# Patient Record
Sex: MALE | Race: WHITE | NOT HISPANIC OR LATINO | Employment: OTHER
[De-identification: names, ages, dates, MRNs, and addresses within clinical notes are randomized per-mention and may not be internally consistent; named-entity substitution may affect disease eponyms.]

---

## 2023-10-30 ENCOUNTER — DASHBOARD ENCOUNTERS (OUTPATIENT)
Age: 83
End: 2023-10-30

## 2023-10-30 ENCOUNTER — OFFICE VISIT (OUTPATIENT)
Dept: URBAN - METROPOLITAN AREA CLINIC 68 | Facility: CLINIC | Age: 83
End: 2023-10-30
Payer: MEDICARE

## 2023-10-30 ENCOUNTER — TELEPHONE ENCOUNTER (OUTPATIENT)
Dept: URBAN - METROPOLITAN AREA CLINIC 68 | Facility: CLINIC | Age: 83
End: 2023-10-30

## 2023-10-30 VITALS
OXYGEN SATURATION: 99 % | SYSTOLIC BLOOD PRESSURE: 132 MMHG | BODY MASS INDEX: 24.44 KG/M2 | WEIGHT: 165 LBS | HEIGHT: 69 IN | DIASTOLIC BLOOD PRESSURE: 80 MMHG | HEART RATE: 83 BPM

## 2023-10-30 DIAGNOSIS — A04.72 C. DIFFICILE DIARRHEA: ICD-10-CM

## 2023-10-30 DIAGNOSIS — K21.9 CHRONIC GERD: ICD-10-CM

## 2023-10-30 PROBLEM — 235595009: Status: ACTIVE | Noted: 2023-10-30

## 2023-10-30 PROCEDURE — 99204 OFFICE O/P NEW MOD 45 MIN: CPT | Performed by: SPECIALIST

## 2023-10-30 RX ORDER — VANCOMYCIN HYDROCHLORIDE 125 MG/1
TAKE ONE CAPSULE BY MOUTH EVERY 6 HOURS CAPSULE ORAL
Qty: 40 UNSPECIFIED | Refills: 0 | Status: ACTIVE | COMMUNITY

## 2023-10-30 RX ORDER — VALSARTAN 40 MG/1
TABLET, FILM COATED ORAL
Qty: 90 TABLET | Status: ACTIVE | COMMUNITY

## 2023-10-30 RX ORDER — OMEPRAZOLE 20 MG/1
CAPSULE, DELAYED RELEASE ORAL
Qty: 90 CAPSULE | Status: ACTIVE | COMMUNITY

## 2023-10-30 RX ORDER — VANCOMYCIN HYDROCHLORIDE 250 MG/1
1 CAPSULE CAPSULE ORAL
Qty: 75 CAPSULES | Refills: 1 | OUTPATIENT
Start: 2023-10-30 | End: 2023-12-24

## 2023-10-30 RX ORDER — FAMOTIDINE 20 MG/1
1 TABLET TABLET, FILM COATED ORAL TWICE A DAY
Qty: 60 TABLET | Refills: 3 | OUTPATIENT
Start: 2023-10-30

## 2023-10-30 RX ORDER — OMEPRAZOLE 20 MG/1
CAPSULE, DELAYED RELEASE ORAL
OUTPATIENT

## 2023-10-30 RX ORDER — ROSUVASTATIN 40 MG/1
TABLET, FILM COATED ORAL
Qty: 90 TABLET | Status: ACTIVE | COMMUNITY

## 2023-10-30 NOTE — HPI-TODAY'S VISIT:
The patient is status post acute appendicitis and appendectomy after perforation performed at [facility] in [facility].After surgery and discharge developed diarrhea described as mucousy or viscous testing revealed positive C. difficile and he was started on vancomycin 125 4 times a day 5 days ago with significant improvement after the third doseHis stools are nearly normal no fever or abdominal painNo prior history of colitis or C. 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  At that time vancomycin to 4 times a day will be initiated.  Prescription given to the patient for feeling to initiate therapy should symptoms recur and patient need to follow-up in his office for further evaluation at that time stool kit and rx given to the pt  Change to famotidine prn consider egd to eval chronic omeprazole dependence

## 2023-11-09 ENCOUNTER — TELEPHONE ENCOUNTER (OUTPATIENT)
Dept: URBAN - METROPOLITAN AREA CLINIC 68 | Facility: CLINIC | Age: 83
End: 2023-11-09

## 2023-11-13 ENCOUNTER — OFFICE VISIT (OUTPATIENT)
Dept: URBAN - METROPOLITAN AREA CLINIC 68 | Facility: CLINIC | Age: 83
End: 2023-11-13

## 2023-11-13 RX ORDER — VANCOMYCIN HYDROCHLORIDE 125 MG/1
TAKE ONE CAPSULE BY MOUTH EVERY 6 HOURS CAPSULE ORAL
Qty: 40 UNSPECIFIED | Refills: 0 | Status: ACTIVE | COMMUNITY

## 2023-11-13 RX ORDER — VANCOMYCIN HYDROCHLORIDE 250 MG/1
1 CAPSULE CAPSULE ORAL
Qty: 75 CAPSULES | Refills: 1 | Status: ACTIVE | COMMUNITY
Start: 2023-10-30 | End: 2023-12-24

## 2023-11-13 RX ORDER — ROSUVASTATIN 40 MG/1
TABLET, FILM COATED ORAL
Qty: 90 TABLET | Status: ACTIVE | COMMUNITY

## 2023-11-13 RX ORDER — FAMOTIDINE 20 MG/1
1 TABLET TABLET, FILM COATED ORAL TWICE A DAY
Qty: 60 TABLET | Refills: 3 | Status: ACTIVE | COMMUNITY
Start: 2023-10-30

## 2023-11-13 RX ORDER — VALSARTAN 40 MG/1
TABLET, FILM COATED ORAL
Qty: 90 TABLET | Status: ACTIVE | COMMUNITY